# Patient Record
Sex: MALE | Race: OTHER | HISPANIC OR LATINO | URBAN - METROPOLITAN AREA
[De-identification: names, ages, dates, MRNs, and addresses within clinical notes are randomized per-mention and may not be internally consistent; named-entity substitution may affect disease eponyms.]

---

## 2020-04-16 ENCOUNTER — OFFICE VISIT (OUTPATIENT)
Dept: FAMILY MEDICINE CLINIC | Age: 40
End: 2020-04-16

## 2020-04-16 DIAGNOSIS — E11.9 DM TYPE 2, GOAL A1C TO BE DETERMINED (HCC): Primary | ICD-10-CM

## 2020-04-16 RX ORDER — GLIPIZIDE 10 MG/1
10 TABLET ORAL 2 TIMES DAILY
Qty: 180 TAB | Refills: 1 | Status: SHIPPED | OUTPATIENT
Start: 2020-04-16 | End: 2020-07-30

## 2020-04-16 RX ORDER — METFORMIN HYDROCHLORIDE 1000 MG/1
1000 TABLET ORAL 2 TIMES DAILY WITH MEALS
Qty: 180 TAB | Refills: 1 | Status: SHIPPED | OUTPATIENT
Start: 2020-04-16 | End: 2020-07-30 | Stop reason: SDUPTHER

## 2020-04-16 RX ORDER — METFORMIN HYDROCHLORIDE 1000 MG/1
2000 TABLET ORAL 2 TIMES DAILY WITH MEALS
COMMUNITY
End: 2020-04-16 | Stop reason: SDUPTHER

## 2020-04-16 RX ORDER — GLIPIZIDE 10 MG/1
10 TABLET ORAL 2 TIMES DAILY
COMMUNITY
End: 2020-04-16 | Stop reason: SDUPTHER

## 2020-04-16 NOTE — PATIENT INSTRUCTIONS
La linea directa para COVID preguntas o sintomas:     [phone]    El steffi para hacer cuca geovanna con la Ricky Fresh:    [phone]

## 2020-04-16 NOTE — PROGRESS NOTES
Assessment/Plan:    Diagnoses and all orders for this visit:    1. DM type 2, goal A1C to be determined ([facility]  -     metFORMIN (GLUCOPHAGE) 1,000 mg tablet; Take 1 Tab by mouth two (2) times daily (with meals). -     glipiZIDE (GLUCOTROL) 10 mg tablet; Take 1 Tab by mouth two (2) times a day. COVID 19 precautions and recommendations discussed with pt  Will schedule him for an office visit once we can, to include labs  [place], try to get records     Follow-up and Dispositions    · Return in about 3 months (around 2020). ROOSEVELT Contreras 6 expressed understanding of this plan. An AVS was printed and given to the patient.      ----------------------------------------------------------------------    Chief Complaint   Patient presents with    Diabetes     medications refill       History of Present Illness:  Telephone virtual visit, pt declined video  Pt consented to visit after being identified by name and   He is being seen as a new pt today for \"medication refills\". He is a diabetic (not clear if type 1 or 2, but dx'd about 13 years ago and weighed 235 on his 5'8\" frame at that time). Initially, was on insulin but that \"didn't work well\" so since then has been on oral agents. Current weight is closer to 205 and he is 5 '8\". He ran out of the medication 3 months ago. When taking meds, his blood sugar is never over 170's he states. Off meds, like now, it is in the 200's. He states that he is following a diabetic diet. He does not smoke or drink ETOH. He denies unexplained weight loss, polyuria/polydipsia but does endorse some blurry vision at times  Due to COVID restrictions, we are not doing routine labs and since he is stable currently, we will wait until we reopen to get the labs       No past medical history on file.     Current Outpatient Medications   Medication Sig Dispense Refill    metFORMIN (GLUCOPHAGE) 1,000 mg tablet Take 1 Tab by mouth two (2) times daily (with meals). 180 Tab 1    glipiZIDE (GLUCOTROL) 10 mg tablet Take 1 Tab by mouth two (2) times a day. 180 Tab 1       No Known Allergies    Social History     Tobacco Use    Smoking status: Not on file   Substance Use Topics    Alcohol use: Not on file    Drug use: Not on file       No family history on file. Physical Exam:     There were no vitals taken for this visit.   No PE done   A&Ox3  WDWN NAD  Respirations normal and non labored

## 2020-04-16 NOTE — PROGRESS NOTES
Coordination of Care  1. Have you been to the ER, urgent care clinic since your last visit? Hospitalized since your last visit? No    2. Have you seen or consulted any other health care providers outside of the [address] [place] [address] since your last visit? Include any pap smears or colon screening. No    Does the patient need refills? YES    Learning Assessment Complete? no  Depression Screening complete in the past 12 months? yes     Reviewed discharge instructions via teleconference with assistance of  Gab Hahn. Reviewed prescriptions, texted Goodrx coupons to patient's phone. Pt is unsure of the name of his doctor from [place], states that he will call and leave a VM with the name of the doctor when he gets back to his house as he is at work currently. Pt given office number and [facility] appointment line with hours of operation. Es Adler to mail discharge instructions. Es Gurrola to mail request of records form for the patient to mail back. Briana Dickson.  [facility]

## 2020-07-30 ENCOUNTER — HOSPITAL ENCOUNTER (OUTPATIENT)
Dept: LAB | Age: 40
Discharge: HOME | End: 2020-07-30

## 2020-07-30 ENCOUNTER — OFFICE VISIT (OUTPATIENT)
Dept: FAMILY MEDICINE CLINIC | Age: 40
End: 2020-07-30

## 2020-07-30 VITALS
BODY MASS INDEX: 36.42 KG/M2 | SYSTOLIC BLOOD PRESSURE: 132 MMHG | OXYGEN SATURATION: 100 % | HEIGHT: 65 IN | RESPIRATION RATE: 18 BRPM | TEMPERATURE: 98.4 F | WEIGHT: 218.6 LBS | HEART RATE: 79 BPM | DIASTOLIC BLOOD PRESSURE: 81 MMHG

## 2020-07-30 DIAGNOSIS — E11.65 TYPE 2 DIABETES MELLITUS WITH HYPERGLYCEMIA, WITHOUT LONG-TERM CURRENT USE OF INSULIN (HCC): Primary | ICD-10-CM

## 2020-07-30 DIAGNOSIS — E11.40 TYPE 2 DIABETES MELLITUS WITH DIABETIC NEUROPATHY, WITHOUT LONG-TERM CURRENT USE OF INSULIN (HCC): ICD-10-CM

## 2020-07-30 DIAGNOSIS — N52.9 ERECTILE DYSFUNCTION, UNSPECIFIED ERECTILE DYSFUNCTION TYPE: ICD-10-CM

## 2020-07-30 PROBLEM — E66.01 SEVERE OBESITY (HCC): Status: ACTIVE | Noted: 2020-07-30

## 2020-07-30 LAB
ALBUMIN SERPL-MCNC: 3.7 G/DL (ref 3.5–5)
ALBUMIN/GLOB SERPL: 1 {RATIO} (ref 1.1–2.2)
ALP SERPL-CCNC: 116 U/L (ref 45–117)
ALT SERPL-CCNC: 34 U/L (ref 12–78)
ANION GAP SERPL CALC-SCNC: 9 MMOL/L (ref 5–15)
AST SERPL-CCNC: 16 U/L (ref 15–37)
BASOPHILS # BLD: 0.1 K/UL (ref 0–0.1)
BASOPHILS NFR BLD: 1 % (ref 0–1)
BILIRUB SERPL-MCNC: 0.6 MG/DL (ref 0.2–1)
BUN SERPL-MCNC: 16 MG/DL (ref 6–20)
BUN/CREAT SERPL: 16 (ref 12–20)
CALCIUM SERPL-MCNC: 8.5 MG/DL (ref 8.5–10.1)
CHLORIDE SERPL-SCNC: 99 MMOL/L (ref 97–108)
CO2 SERPL-SCNC: 25 MMOL/L (ref 21–32)
CREAT SERPL-MCNC: 1.02 MG/DL (ref 0.7–1.3)
CREAT UR-MCNC: 74 MG/DL
DIFFERENTIAL METHOD BLD: ABNORMAL
EOSINOPHIL # BLD: 0.6 K/UL (ref 0–0.4)
EOSINOPHIL NFR BLD: 8 % (ref 0–7)
ERYTHROCYTE [DISTWIDTH] IN BLOOD BY AUTOMATED COUNT: 13.2 % (ref 11.5–14.5)
EST. AVERAGE GLUCOSE BLD GHB EST-MCNC: 255 MG/DL
GLOBULIN SER CALC-MCNC: 3.7 G/DL (ref 2–4)
GLUCOSE POC: 313 MG/DL
GLUCOSE SERPL-MCNC: 291 MG/DL (ref 65–100)
HBA1C MFR BLD: 10.5 % (ref 4–5.6)
HCT VFR BLD AUTO: 42.1 % (ref 36.6–50.3)
HGB BLD-MCNC: 13.9 G/DL (ref 12.1–17)
IMM GRANULOCYTES # BLD AUTO: 0 K/UL (ref 0–0.04)
IMM GRANULOCYTES NFR BLD AUTO: 0 % (ref 0–0.5)
LYMPHOCYTES # BLD: 3.1 K/UL (ref 0.8–3.5)
LYMPHOCYTES NFR BLD: 39 % (ref 12–49)
MCH RBC QN AUTO: 27.9 PG (ref 26–34)
MCHC RBC AUTO-ENTMCNC: 33 G/DL (ref 30–36.5)
MCV RBC AUTO: 84.4 FL (ref 80–99)
MICROALBUMIN UR-MCNC: 2.84 MG/DL
MICROALBUMIN/CREAT UR-RTO: 38 MG/G (ref 0–30)
MONOCYTES # BLD: 0.7 K/UL (ref 0–1)
MONOCYTES NFR BLD: 9 % (ref 5–13)
NEUTS SEG # BLD: 3.5 K/UL (ref 1.8–8)
NEUTS SEG NFR BLD: 43 % (ref 32–75)
NRBC # BLD: 0 K/UL (ref 0–0.01)
NRBC BLD-RTO: 0 PER 100 WBC
PLATELET # BLD AUTO: 334 K/UL (ref 150–400)
PMV BLD AUTO: 10.8 FL (ref 8.9–12.9)
POTASSIUM SERPL-SCNC: 3.9 MMOL/L (ref 3.5–5.1)
PROT SERPL-MCNC: 7.4 G/DL (ref 6.4–8.2)
RBC # BLD AUTO: 4.99 M/UL (ref 4.1–5.7)
SODIUM SERPL-SCNC: 133 MMOL/L (ref 136–145)
WBC # BLD AUTO: 8 K/UL (ref 4.1–11.1)

## 2020-07-30 PROCEDURE — 82043 UR ALBUMIN QUANTITATIVE: CPT

## 2020-07-30 PROCEDURE — 83036 HEMOGLOBIN GLYCOSYLATED A1C: CPT

## 2020-07-30 PROCEDURE — 85025 COMPLETE CBC W/AUTO DIFF WBC: CPT

## 2020-07-30 PROCEDURE — 80053 COMPREHEN METABOLIC PANEL: CPT

## 2020-07-30 RX ORDER — GLIPIZIDE 10 MG/1
10 TABLET ORAL 2 TIMES DAILY
Qty: 180 TAB | Refills: 1 | Status: SHIPPED | OUTPATIENT
Start: 2020-07-30 | End: 2021-10-13 | Stop reason: SDUPTHER

## 2020-07-30 RX ORDER — METFORMIN HYDROCHLORIDE 1000 MG/1
1000 TABLET ORAL 2 TIMES DAILY WITH MEALS
Qty: 180 TAB | Refills: 1 | Status: SHIPPED | OUTPATIENT
Start: 2020-07-30 | End: 2021-10-13 | Stop reason: SDUPTHER

## 2020-07-30 RX ORDER — SILDENAFIL CITRATE 20 MG/1
100 TABLET ORAL
Qty: 40 TAB | Refills: 1 | Status: SHIPPED | OUTPATIENT
Start: 2020-07-30 | End: 2021-10-13

## 2020-07-30 NOTE — PROGRESS NOTES
Assessment/Plan:       ICD-10-CM ICD-9-CM    1. Type 2 diabetes mellitus with hyperglycemia, without long-term current use of insulin (HCC)  E11.65 250.00 AMB POC GLUCOSE BLOOD, BY GLUCOSE MONITORING DEVICE     652.06 METABOLIC PANEL, COMPREHENSIVE      HEMOGLOBIN A1C WITH EAG      MICROALBUMIN, UR, RAND W/ MICROALB/CREAT RATIO      CBC WITH AUTOMATED DIFF      glipiZIDE (GLUCOTROL) 10 mg tablet      metFORMIN (GLUCOPHAGE) 1,000 mg tablet   2. Erectile dysfunction, unspecified erectile dysfunction type  N52.9 607.84 sildenafiL, pulmonary hypertension, (Revatio) 20 mg tablet   3. Type 2 diabetes mellitus with diabetic neuropathy, without long-term current use of insulin (HCC)  E11.40 250.60      357.2      Future labs: fasting lipid panel, tsh, testosterone  Follow up appointment: telephone visit 1 month, note sent to registrar for scheduling. Changes/recommendations made today: take glipizide 10mg 30 minutes before breakfast and 30 minutes before dinner. Previously taking glipizide 2 hours before food in the morning and 2 hours after food in the evening. Consider Trulicity once weekly from PAP for better glycemic control. Consider decreasing glipizide to 5 mg before dinner. Consider small basal dose of lantus at night and consider Januvia or Trulicity. Current Outpatient Medications   Medication Sig Dispense    glipiZIDE (GLUCOTROL) 10 mg tablet Take 1 Tab by mouth two (2) times a day. 30 minutes before food. Pakistani sig 180 Tab    metFORMIN (GLUCOPHAGE) 1,000 mg tablet Take 1 Tab by mouth two (2) times daily (with meals). 180 Tab    sildenafiL, pulmonary hypertension, (Revatio) 20 mg tablet Take 5 Tabs by mouth daily as needed (30 minutes before sexual activity). 40 Tab     No current facility-administered medications for this visit.       [facility]AN-MAIN OFFICE  Subjective:     Chief Complaint   Patient presents with    Follow Up Chronic Condition     DM    [place] is a 46 y.o. OTHER male who speaks East Timorese.  used? yes   HPI: getting angry easily, usually at work, wants something for depression and wants viagra. States sleeping well through the night, no longer waking up 2-3 times to urinate. Says his wife calls him 'bipolar' because he'll be angry one minute and then happy. States he had a liver ultrasound 2 years ago and told that his cholesterol was fine. May have had liver ultrasound due to hepatic steatosis. He did not run out of medication this time. He'll miss the morning meds sometimes. This morning he did not eat breakfast.  He took glipizide 10 mg and metformin at 6 am.  Then he ate breakfast at 8 am.  In the evening he was taking glipizide 10 mg and metformin between 9 and 10 pm.  He eats dinner at 5 pm.  Insulin was stopped because glucose got down to 70 or 80 and he was shaking - was at [facility] before this. Lived here 10 yrs, [place] 6 years, now back for a year here. He was taking a medicine to calm him down, small pills, 5 mg. \"[facility]\" was the clinic in [address] [place] [address]. About once every 3 weeks he feels dizzy or weak in the afternoon and drinks a soda. Otherwise denies symptoms of hypoglycemia. Had an ultrasound of the liver 2 years ago and told his cholesterol was normal.  Reports for many years he has not had a morning erection. He has a friend who was helped by \"hormones\". ROS:   Positive for blurry vision, numbness and tingling in his feet, pain in the feet  No hypoglycemia; No increased frequency of urination,   No weight loss,  No chest pain, dyspnea or TIA's;   Working: Third week that he's working every day  Last ate: did not eat breakfast but took glipizide and metformin at 6 am this morning, had a soda immediately before appointment today. Results for orders placed or performed in visit on 07/30/20   AMB POC GLUCOSE BLOOD, BY GLUCOSE MONITORING DEVICE   Result Value Ref Range    Glucose  nonfasting mg/dL     Measuring glucoses?  yes Fasting values 170 to 180; 2 hour post prandial values 270 to 340  Social History: He reports that he has quit smoking. He does not have any smokeless tobacco history on file. He reports previous alcohol use. He reports previous drug use. Family History: His family history includes Diabetes in his maternal grandfather, maternal grandmother, mother, paternal grandfather, and another family member; No Known Problems in his father, paternal grandmother, and sister. Objective:   Weight 3 months ago was 205. Vitals:    07/30/20 1511   BP: 132/81   Pulse: 79   Resp: 18   Temp: 98.4 °F (36.9 °C)   TempSrc: Oral   SpO2: 100%   Weight: 218 lb 9.6 oz (99.2 kg)   Height: 5' 4.96\" (1.65 m)    No LMP for male patient. Results for orders placed or performed during the hospital encounter of 07/30/20   CBC WITH AUTOMATED DIFF   Result Value Ref Range    WBC 8.0 4.1 - 11.1 K/uL    RBC 4.99 4. 10 - 5.70 M/uL    HGB 13.9 12.1 - 17.0 g/dL    HCT 42.1 36.6 - 50.3 %    MCV 84.4 80.0 - 99.0 FL    MCH 27.9 26.0 - 34.0 PG    MCHC 33.0 30.0 - 36.5 g/dL    RDW 13.2 11.5 - 14.5 %    PLATELET 474 723 - 578 K/uL    MPV 10.8 8.9 - 12.9 FL    NRBC 0.0 0  WBC    ABSOLUTE NRBC 0.00 0.00 - 0.01 K/uL    NEUTROPHILS 43 32 - 75 %    LYMPHOCYTES 39 12 - 49 %    MONOCYTES 9 5 - 13 %    EOSINOPHILS 8 (H) 0 - 7 %    BASOPHILS 1 0 - 1 %    IMMATURE GRANULOCYTES 0 0.0 - 0.5 %    ABS. NEUTROPHILS 3.5 1.8 - 8.0 K/UL    ABS. LYMPHOCYTES 3.1 0.8 - 3.5 K/UL    ABS. MONOCYTES 0.7 0.0 - 1.0 K/UL    ABS. EOSINOPHILS 0.6 (H) 0.0 - 0.4 K/UL    ABS. BASOPHILS 0.1 0.0 - 0.1 K/UL    ABS. IMM.  GRANS. 0.0 0.00 - 0.04 K/UL    DF AUTOMATED     METABOLIC PANEL, COMPREHENSIVE   Result Value Ref Range    Sodium 133 (L) 136 - 145 mmol/L    Potassium 3.9 3.5 - 5.1 mmol/L    Chloride 99 97 - 108 mmol/L    CO2 25 21 - 32 mmol/L    Anion gap 9 5 - 15 mmol/L    Glucose 291 (H) 65 - 100 mg/dL    BUN 16 6 - 20 MG/DL    Creatinine 1.02 0.70 - 1.30 MG/DL    BUN/Creatinine ratio 16 12 - 20      GFR est AA >60 >60 ml/min/1.73m2    GFR est non-AA >60 >60 ml/min/1.73m2    Calcium 8.5 8.5 - 10.1 MG/DL    Bilirubin, total 0.6 0.2 - 1.0 MG/DL    ALT (SGPT) 34 12 - 78 U/L    AST (SGOT) 16 15 - 37 U/L    Alk. phosphatase 116 45 - 117 U/L    Protein, total 7.4 6.4 - 8.2 g/dL    Albumin 3.7 3.5 - 5.0 g/dL    Globulin 3.7 2.0 - 4.0 g/dL    A-G Ratio 1.0 (L) 1.1 - 2.2     HEMOGLOBIN A1C WITH EAG   Result Value Ref Range    Hemoglobin A1c 10.5 (H) 4.0 - 5.6 %    Est. average glucose 255 mg/dL   MICROALBUMIN, UR, RAND W/ MICROALB/CREAT RATIO   Result Value Ref Range    Microalbumin,urine random 2.84 MG/DL    Creatinine, urine 74.00 mg/dL    Microalbumin/Creat ratio (mg/g creat) 38 (H) 0 - 30 mg/g   Results for orders placed or performed in visit on 07/30/20   AMB POC GLUCOSE BLOOD, BY GLUCOSE MONITORING DEVICE   Result Value Ref Range    Glucose  mg/dL     Wt Readings from Last 2 Encounters:   07/30/20 218 lb 9.6 oz (99.2 kg)    Weight increased from 205     Physical Exam:  Constitutional: He appears well-developed. Cardiovascular: Normal rate, regular rhythm, normal heart sounds and intact distal pulses. No murmur heard. Pulmonary/Chest: Effort normal and breath sounds normal.   Musculoskeletal: He exhibits no edema. No ulcers of the lower extremities. No results found for: CHOL, CHOLX, CHLST, CHOLV, HDL, HDLP, LDL, LDLC, DLDLP, TGLX, TRIGL, TRIGP, CHHD, CHHDX  Lab Results   Component Value Date/Time    ALT (SGPT) 34 07/30/2020 03:35 PM    Alk.  phosphatase 116 07/30/2020 03:35 PM    Bilirubin, total 0.6 07/30/2020 03:35 PM     Lab Results   Component Value Date/Time    Microalbumin/Creat ratio (mg/g creat) 38 (H) 07/30/2020 03:35 PM    Microalbumin,urine random 2.84 07/30/2020 03:35 PM    Creatinine 1.02 07/30/2020 03:35 PM     Lab Results   Component Value Date/Time    GFR est AA >60 07/30/2020 03:35 PM    GFR est non-AA >60 07/30/2020 03:35 PM     Assessment/Plan:   Diagnoses and all orders for this visit: 1. Type 2 diabetes mellitus with hyperglycemia, without long-term current use of insulin (HCC)  -     AMB POC GLUCOSE BLOOD, BY GLUCOSE MONITORING DEVICE  -     METABOLIC PANEL, COMPREHENSIVE; Future  -     HEMOGLOBIN A1C WITH EAG; Future  -     MICROALBUMIN, UR, RAND W/ MICROALB/CREAT RATIO; Future  -     CBC WITH AUTOMATED DIFF; Future  -     glipiZIDE (GLUCOTROL) 10 mg tablet; Take 1 Tab by mouth two (2) times a day. 30 minutes before food. Bengali sig  -     metFORMIN (GLUCOPHAGE) 1,000 mg tablet; Take 1 Tab by mouth two (2) times daily (with meals). 2. Erectile dysfunction, unspecified erectile dysfunction type  -     sildenafiL, pulmonary hypertension, (Revatio) 20 mg tablet; Take 5 Tabs by mouth daily as needed (30 minutes before sexual activity). 3. Type 2 diabetes mellitus with diabetic neuropathy, without long-term current use of insulin (HCC)      Diabetes Mellitus type 2, under Poor control. Blood pressure under uncertain control. Greater than 50% of this 25 minute visit was spent in face-to-face counseling/coordination of care regarding diabetes management. Discussed reason to take glipizide 30 minutes before breakfast and dinner. Discussed anger problems may in part be related to glucose variability. Telephone 1 month. Diane Olvera DNP, FNP-BC, [facility] expressed understanding of this plan.

## 2020-07-30 NOTE — PROGRESS NOTES
Coordination of Care  1. Have you been to the ER, urgent care clinic since your last visit? Hospitalized since your last visit? No    2. Have you seen or consulted any other health care providers outside of the [address] [place] [address] since your last visit? Include any pap smears or colon screening. No    Does the patient need refills? YES, depression- \"when I get angry I go directly from one to ten\" and viagra refill requested    Learning Assessment Complete? no  Depression Screening complete in the past 12 months? yes    Intake completed with assistance of jacinto Araujo. Rashaad Mims.  [place], [place]

## 2020-08-10 ENCOUNTER — PATIENT OUTREACH (OUTPATIENT)
Dept: FAMILY MEDICINE CLINIC | Age: 40
End: 2020-08-10

## 2020-08-11 NOTE — PROGRESS NOTES
8/11/20    [address] [facility]   Chronic Disease Management Nurse Navigator Note    Diagnosis: Diabetes Mellitus    Adherence - PCP: NO  Adherence - Medications: compliant with all meds  Barriers: Meal choices is a challenge. ED visit with reachout: NO  If ED visit with reachout, plan: na  If ED visit without reachout, details: na    DM  Neuropathic pain NO  Vision disturbance: no  Polyuria:no  Polydipsia: yes  24 hour dietary recall:  TBD on next NN call. BG I day ago was 320 fasting. Goals      Patient verbalizes understanding of self -management goals of living with Diabetes. 8/11/20  Xochitl Mays will monitor BG at home and report greater than 250 and less than 80 to NN ASAP. Continue taking medicatins as ordered, call Nn with concerns, start adding 2 vegeatble to each meal, NN will call in 1 week for BG results.  IM          Time in discussion: 45 minutes

## 2020-08-13 NOTE — PROGRESS NOTES
Tc to the pt. He was called and given his A1C results and told his average daily BS is 255. The pt was told the provider is referring him to a NN Germaine. She will be calling him to talk with him to discuss his DM and assist him with helpful tips and ways he can help lower his BS numbers. The pt stated he has purchased a new BS meter. It was discussed with the pt ways to check the BS fasting and 2 hrs after a meal and logging the numbers for the provider to go over them at his next appt. The pt verbalized understanding. He denied the need for an  during this call.  Rosy Potter RN

## 2021-03-26 ENCOUNTER — TELEPHONE (OUTPATIENT)
Dept: FAMILY MEDICINE CLINIC | Age: 41
End: 2021-03-26

## 2021-03-26 NOTE — TELEPHONE ENCOUNTER
Tc to the pt with the assistance of Nita Anne. The pt for 3 days I have red eyes and lose balance when I bend down. The pt stated when I drink water I start coughing for 3 weeks. Inflammation in my eye lids. Denied numbness in face. Mild H/A, last week for 2 days. Pt stated he has DM, and normally tired and weak. The pt stated no injury or accident. The pt denied any coughing except when he drinks cold water. The pt stated he thinks he has a cold. Non productive cough. No fever, no chills, no N/V. The pt was advised to go to the hospital with any emergency which would be Chest pain, SOB, Numbness or weakness in extremities one side of the or the other. The pt was advised to go to have a Covid test and contact this nurse back for an CAV appt with a provider once he has his results form the Covid test. The pt was given phone # and address of a walk in clinic to go get a Covid test. The pt verbalized understanding.  Alyssa Apley, RN

## 2021-10-13 ENCOUNTER — VIRTUAL VISIT (OUTPATIENT)
Dept: FAMILY MEDICINE CLINIC | Age: 41
End: 2021-10-13

## 2021-10-13 DIAGNOSIS — J06.9 ACUTE UPPER RESPIRATORY INFECTION: Primary | ICD-10-CM

## 2021-10-13 DIAGNOSIS — E11.65 TYPE 2 DIABETES MELLITUS WITH HYPERGLYCEMIA, WITHOUT LONG-TERM CURRENT USE OF INSULIN (HCC): ICD-10-CM

## 2021-10-13 PROCEDURE — 99213 OFFICE O/P EST LOW 20 MIN: CPT | Performed by: FAMILY MEDICINE

## 2021-10-13 RX ORDER — OXYMETAZOLINE HCL 0.05 %
2 SPRAY, NON-AEROSOL (ML) NASAL 2 TIMES DAILY
Qty: 15 ML | Refills: 0 | Status: SHIPPED | OUTPATIENT
Start: 2021-10-13 | End: 2021-10-16

## 2021-10-13 RX ORDER — GLIPIZIDE 10 MG/1
10 TABLET ORAL 2 TIMES DAILY
Qty: 180 TABLET | Refills: 1 | Status: SHIPPED | OUTPATIENT
Start: 2021-10-13 | End: 2022-10-04 | Stop reason: SDUPTHER

## 2021-10-13 RX ORDER — IBUPROFEN 800 MG/1
800 TABLET ORAL
Qty: 30 TABLET | Refills: 0 | Status: SHIPPED | OUTPATIENT
Start: 2021-10-13

## 2021-10-13 RX ORDER — BENZONATATE 200 MG/1
200 CAPSULE ORAL
Qty: 30 CAPSULE | Refills: 0 | Status: SHIPPED | OUTPATIENT
Start: 2021-10-13 | End: 2021-10-20

## 2021-10-13 RX ORDER — METFORMIN HYDROCHLORIDE 1000 MG/1
1000 TABLET ORAL 2 TIMES DAILY WITH MEALS
Qty: 180 TABLET | Refills: 1 | Status: SHIPPED | OUTPATIENT
Start: 2021-10-13 | End: 2022-10-04 | Stop reason: SDUPTHER

## 2021-10-13 NOTE — PROGRESS NOTES
Lauro Soares is a 45 y.o. male   Chief Complaint   Patient presents with    Concern For COVID-19 (Coronavirus)     Pt states has the fever/chills, muscle pain, headaches, weakness, cough, shortness of breath, loss of smell, runny nose, on and off x 4 weeks    Follow Up Chronic Condition     diabetes     >>>>>>>>>>>>>TELEPHONE ENCOUNTER<<<<<<<<<<<<<<<<<<<<      ASSESSMENT AND PLAN:    1. Type 2 diabetes mellitus with hyperglycemia, without long-term current use of insulin (HCC)  >Medications refilled. >Pt will need DM2 followup once acute illness and quarantine is completed. - glipiZIDE (GLUCOTROL) 10 mg tablet; Take 1 Tablet by mouth two (2) times a day. 30 minutes before food. Mozambican sig  Dispense: 180 Tablet; Refill: 1  - metFORMIN (GLUCOPHAGE) 1,000 mg tablet; Take 1 Tablet by mouth two (2) times daily (with meals). Dispense: 180 Tablet; Refill: 1    2. Acute upper respiratory infection  Vaccinated, but had Covid+ contact at work.  > COVID testing  > Symptomatic management  > Quarantine  > ER precautions reviewed. - oxymetazoline (Afrin, oxymetazoline,) 0.05 % nasal spray; 2 Sprays by Both Nostrils route two (2) times a day for 3 days. Dispense: 15 mL; Refill: 0  - ibuprofen (MOTRIN) 800 mg tablet; Take 1 Tablet by mouth every eight (8) hours as needed for Pain (or fever). Dispense: 30 Tablet; Refill: 0  - benzonatate (TESSALON) 200 mg capsule; Take 1 Capsule by mouth three (3) times daily as needed for Cough for up to 7 days. Dispense: 30 Capsule; Refill: 0          SUBJECTIVE:    HPI:  Lauro Soares is a 45 y.o. male who presents with URI symptoms with concern for COVID-19. He is fully vaccinated but a coworker tested positive. He has had 4 weeks of night fevers, cough, nasal congestion, body aches, headaches and weakness. Currently the nasal congestion is the worst symptom for him. He denies SOB or TRONCOSO. He is out of his diabetes medications, his last A1C in July 2020 was 10.5.   (His wife was my last patient and they are currently . She reports that she was the one managing his DM medications so she's worried that he is not taking care of himself now. She also warns me that he tends to underplay his symptoms. She says his cough has been really bad. She has given him NSAIDs for his body aches)      Review of Systems   Constitutional: Positive for fever and malaise/fatigue. HENT: Positive for congestion and sore throat. Negative for ear pain. Eyes: Negative for blurred vision. Respiratory: Positive for cough, sputum production and shortness of breath. Cardiovascular: Negative for chest pain, palpitations and leg swelling. Gastrointestinal: Positive for nausea. Negative for abdominal pain, constipation, diarrhea and vomiting. Genitourinary: Negative. Musculoskeletal: Positive for myalgias. Skin: Negative for rash. Neurological: Positive for dizziness and headaches. There were no vitals taken for this visit. Physical Exam Not performed; telephone encounter.

## 2021-10-13 NOTE — PROGRESS NOTES
Per patient no vital equipment available at home  Coordination of Care  1. Have you been to the ER, urgent care clinic since your last visit? Hospitalized since your last visit? No    2. Have you seen or consulted any other health care providers outside of the [address] [place] [address] since your last visit? Include any pap smears or colon screening. No    Does the patient need refills? YES    Learning Assessment Complete? yes  Depression Screening complete in the past 12 months? yes     1455 pm: Per provider following notes \"COVID testing location, Meds sent to [facility] (including DM refills). \" CMA called the patient to confirmed receipt of the GoodRx coupons. Explained to pt the coupon process and discussed medication/refills with pt. CMA made the patient to writhe the information on a piece of paper on how he is going to take the medicine/dosis. Free covid 19 testing site from the Health t at [address] was given to pt and the [employer] free daily testing information too.  Patient verbalized understanding

## 2022-03-19 PROBLEM — E66.01 SEVERE OBESITY (HCC): Status: ACTIVE | Noted: 2020-07-30

## 2022-11-07 ENCOUNTER — HOSPITAL ENCOUNTER (OUTPATIENT)
Dept: LAB | Age: 42
Discharge: HOME | End: 2022-11-07

## 2022-11-07 ENCOUNTER — OFFICE VISIT (OUTPATIENT)
Dept: FAMILY MEDICINE CLINIC | Age: 42
End: 2022-11-07

## 2022-11-07 VITALS
SYSTOLIC BLOOD PRESSURE: 129 MMHG | WEIGHT: 213 LBS | OXYGEN SATURATION: 98 % | BODY MASS INDEX: 35.49 KG/M2 | DIASTOLIC BLOOD PRESSURE: 74 MMHG | HEART RATE: 93 BPM | TEMPERATURE: 97.3 F

## 2022-11-07 DIAGNOSIS — E11.65 TYPE 2 DIABETES MELLITUS WITH HYPERGLYCEMIA, WITHOUT LONG-TERM CURRENT USE OF INSULIN (HCC): ICD-10-CM

## 2022-11-07 DIAGNOSIS — Z23 ENCOUNTER FOR IMMUNIZATION: ICD-10-CM

## 2022-11-07 DIAGNOSIS — E11.65 TYPE 2 DIABETES MELLITUS WITH HYPERGLYCEMIA, WITHOUT LONG-TERM CURRENT USE OF INSULIN (HCC): Primary | ICD-10-CM

## 2022-11-07 DIAGNOSIS — F34.1 DYSTHYMIA: ICD-10-CM

## 2022-11-07 LAB — GLUCOSE POC: 273 MG/DL

## 2022-11-07 PROCEDURE — 90686 IIV4 VACC NO PRSV 0.5 ML IM: CPT

## 2022-11-07 PROCEDURE — 80061 LIPID PANEL: CPT

## 2022-11-07 PROCEDURE — 82962 GLUCOSE BLOOD TEST: CPT | Performed by: FAMILY MEDICINE

## 2022-11-07 PROCEDURE — 83721 ASSAY OF BLOOD LIPOPROTEIN: CPT

## 2022-11-07 PROCEDURE — 90471 IMMUNIZATION ADMIN: CPT

## 2022-11-07 PROCEDURE — 36415 COLL VENOUS BLD VENIPUNCTURE: CPT

## 2022-11-07 PROCEDURE — 99213 OFFICE O/P EST LOW 20 MIN: CPT | Performed by: FAMILY MEDICINE

## 2022-11-07 PROCEDURE — 82043 UR ALBUMIN QUANTITATIVE: CPT

## 2022-11-07 PROCEDURE — 80053 COMPREHEN METABOLIC PANEL: CPT

## 2022-11-07 PROCEDURE — 83036 HEMOGLOBIN GLYCOSYLATED A1C: CPT

## 2022-11-07 RX ORDER — CITALOPRAM 20 MG/1
20 TABLET, FILM COATED ORAL DAILY
Qty: 90 TABLET | Refills: 2 | Status: SHIPPED | OUTPATIENT
Start: 2022-11-07

## 2022-11-07 RX ORDER — GLIPIZIDE 10 MG/1
10 TABLET ORAL 2 TIMES DAILY
Qty: 180 TABLET | Refills: 3 | Status: SHIPPED | OUTPATIENT
Start: 2022-11-07

## 2022-11-07 RX ORDER — METFORMIN HYDROCHLORIDE 1000 MG/1
1000 TABLET ORAL 2 TIMES DAILY WITH MEALS
Qty: 180 TABLET | Refills: 3 | Status: SHIPPED | OUTPATIENT
Start: 2022-11-07

## 2022-11-07 NOTE — PROGRESS NOTES
Name and  confirmed w/ pt. Vaccine administered per pt request after obtaining consent, confirming VIS understanding, and ruling out contraindications. Pt instructed about signs of allergic reactions and when to seek emergency care. Pt verbalized understanding. Pt tolerated vaccination well, no adverse effects noted. Recorded in EMR and VIIS. Pt instructed to stay in waiting area for 15 min for observation. Pt discharged in stable condition. CVAN  [facility] assisted with interpretation.

## 2022-11-07 NOTE — PROGRESS NOTES
Coordination of Care  1. Have you been to the ER, urgent care clinic since your last visit? Hospitalized since your last visit? No    2. Have you seen or consulted any other health care providers outside of the [address] [place] [address] since your last visit? Include any pap smears or colon screening. No    Does the patient need refills?  N/A    Learning Assessment Complete? yes  Results for orders placed or performed in visit on 11/07/22   AMB POC GLUCOSE BLOOD, BY GLUCOSE MONITORING DEVICE   Result Value Ref Range    Glucose  MG/DL

## 2022-11-07 NOTE — PROGRESS NOTES
I have printed AVS and reviewed it with patient today. Patient verbalized understanding. I reviewed with patient medications sent to pharmacy and how the medication is taken. Patient verbalized understanding and did not have any questions concerning his medications. No GoodRx was needed. I instructed patient to schedule a follow-up appointment prior to leaving today. Patient verbalized understanding. Patient correctly stated his full name and date of birth prior to the information shared.  [id number] with the [facility] services assisted with this discharge.   Génesis Brown RN

## 2022-11-07 NOTE — PROGRESS NOTES
HISTORY OF PRESENT ILLNESS  Chanel Calles is a 37 y.o. male. HPI  Patient states he needs refills of his medication for diabetes. He also has had problems with dysthymia. Gets very angry easily. Interested in medication management,  Had been offered SSRI in the past but patient  Did not started them  States he was cleaning gutters at home and then fell,  he had fracture his right foot and had surgery, will need subsequent surgeries. Review of Systems   Constitutional:  Negative for chills, fever and weight loss. Respiratory:  Negative for cough, hemoptysis and sputum production. Cardiovascular:  Negative for chest pain, palpitations and orthopnea. Gastrointestinal:  Negative for heartburn, nausea and vomiting. Musculoskeletal:  Positive for joint pain. Right ankle and foot pain   Psychiatric/Behavioral:  Negative for depression, hallucinations, memory loss and suicidal ideas. The patient is not nervous/anxious and does not have insomnia. Anger   /74   Pulse 93   Temp 97.3 °F (36.3 °C) (Temporal)   Wt 213 lb (96.6 kg)   SpO2 98%   BMI 35.49 kg/m²   Physical Exam  Constitutional:       General: He is not in acute distress. HENT:      Head: Normocephalic. Right Ear: Tympanic membrane normal.      Left Ear: Tympanic membrane normal.   Cardiovascular:      Rate and Rhythm: Normal rate and regular rhythm. Pulses: Normal pulses. Heart sounds: Normal heart sounds. No murmur heard. Pulmonary:      Effort: Pulmonary effort is normal.      Breath sounds: Normal breath sounds. No wheezing, rhonchi or rales. Abdominal:      General: Bowel sounds are normal. There is no distension. Palpations: Abdomen is soft. Tenderness: There is no abdominal tenderness. Hernia: No hernia is present. Musculoskeletal:      Cervical back: Normal range of motion. No rigidity.       Comments: Right ankle and foot pain, immobilized in aircast   Neurological:      Mental Status: He is alert. ASSESSMENT and PLAN  Diagnoses and all orders for this visit:    1. Type 2 diabetes mellitus with hyperglycemia, without long-term current use of insulin ([facility])  -     AMB POC GLUCOSE BLOOD, BY GLUCOSE MONITORING DEVICE  -     metFORMIN (GLUCOPHAGE) 1,000 mg tablet; Take 1 Tablet by mouth two (2) times daily (with meals). -     glipiZIDE (GLUCOTROL) 10 mg tablet; Take 1 Tablet by mouth two (2) times a day. 30 minutes before food. Omani sig  -     HEMOGLOBIN A1C WITH EAG; Future  -     METABOLIC PANEL, COMPREHENSIVE; Future  -     CBC WITH AUTOMATED DIFF; Future  -     MICROALBUMIN, UR, RAND W/ MICROALB/CREAT RATIO; Future  -     LIPID PANEL; Future    2. Dysthymia  -     citalopram (CELEXA) 20 mg tablet; Take 1 Tablet by mouth daily.     3. Encounter for immunization  -     INFLUENZA, FLUARIX, FLULAVAL, FLUZONE (AGE 6 MO+), AFLURIA(AGE 3Y+) IM, PF, [de-identified]5 ML    37year old male with type 2 diabetes, medications refilled, labs ordered  Dysthymia,  start celexa  Flu vaccine prescribed

## 2022-11-08 ENCOUNTER — TELEPHONE (OUTPATIENT)
Dept: FAMILY MEDICINE CLINIC | Age: 42
End: 2022-11-08

## 2022-11-08 LAB
ALBUMIN SERPL-MCNC: 3.5 G/DL (ref 3.5–5)
ALBUMIN/GLOB SERPL: 0.8 {RATIO} (ref 1.1–2.2)
ALP SERPL-CCNC: 112 U/L (ref 45–117)
ALT SERPL-CCNC: 27 U/L (ref 12–78)
ANION GAP SERPL CALC-SCNC: 9 MMOL/L (ref 5–15)
AST SERPL-CCNC: 17 U/L (ref 15–37)
BILIRUB SERPL-MCNC: 0.6 MG/DL (ref 0.2–1)
BUN SERPL-MCNC: 19 MG/DL (ref 6–20)
BUN/CREAT SERPL: 17 (ref 12–20)
CALCIUM SERPL-MCNC: 9.2 MG/DL (ref 8.5–10.1)
CHLORIDE SERPL-SCNC: 99 MMOL/L (ref 97–108)
CHOLEST SERPL-MCNC: 291 MG/DL
CO2 SERPL-SCNC: 26 MMOL/L (ref 21–32)
CREAT SERPL-MCNC: 1.13 MG/DL (ref 0.7–1.3)
CREAT UR-MCNC: 123 MG/DL
EST. AVERAGE GLUCOSE BLD GHB EST-MCNC: 295 MG/DL
GLOBULIN SER CALC-MCNC: 4.3 G/DL (ref 2–4)
GLUCOSE SERPL-MCNC: 321 MG/DL (ref 65–100)
HBA1C MFR BLD: 11.9 % (ref 4–5.6)
HDLC SERPL-MCNC: 44 MG/DL
HDLC SERPL: 6.6 {RATIO} (ref 0–5)
LDLC SERPL CALC-MCNC: ABNORMAL MG/DL (ref 0–100)
LDLC SERPL DIRECT ASSAY-MCNC: 188 MG/DL (ref 0–100)
MICROALBUMIN UR-MCNC: 15.5 MG/DL
MICROALBUMIN/CREAT UR-RTO: 126 MG/G (ref 0–30)
POTASSIUM SERPL-SCNC: 4.1 MMOL/L (ref 3.5–5.1)
PROT SERPL-MCNC: 7.8 G/DL (ref 6.4–8.2)
SODIUM SERPL-SCNC: 134 MMOL/L (ref 136–145)
TRIGL SERPL-MCNC: 402 MG/DL (ref ?–150)
VLDLC SERPL CALC-MCNC: ABNORMAL MG/DL

## 2022-11-09 NOTE — PROGRESS NOTES
Uncontrolled diabetes  Elevated cholesterol levels  Healthy diet and regular physical activity recommended  During his next visit we can discussed adding cholesterol medication  Patient can be informed

## 2023-01-25 ENCOUNTER — HOSPITAL ENCOUNTER (OUTPATIENT)
Dept: LAB | Age: 43
Discharge: HOME | End: 2023-01-25

## 2023-01-25 ENCOUNTER — OFFICE VISIT (OUTPATIENT)
Dept: FAMILY MEDICINE CLINIC | Age: 43
End: 2023-01-25

## 2023-01-25 VITALS
WEIGHT: 210.9 LBS | OXYGEN SATURATION: 100 % | DIASTOLIC BLOOD PRESSURE: 76 MMHG | HEART RATE: 85 BPM | SYSTOLIC BLOOD PRESSURE: 118 MMHG | TEMPERATURE: 97.9 F | BODY MASS INDEX: 35.14 KG/M2

## 2023-01-25 DIAGNOSIS — E11.65 TYPE 2 DIABETES MELLITUS WITH HYPERGLYCEMIA, WITHOUT LONG-TERM CURRENT USE OF INSULIN (HCC): Primary | ICD-10-CM

## 2023-01-25 DIAGNOSIS — F34.1 DYSTHYMIA: ICD-10-CM

## 2023-01-25 DIAGNOSIS — E11.65 TYPE 2 DIABETES MELLITUS WITH HYPERGLYCEMIA, WITHOUT LONG-TERM CURRENT USE OF INSULIN (HCC): ICD-10-CM

## 2023-01-25 LAB — GLUCOSE POC: NORMAL MG/DL

## 2023-01-25 PROCEDURE — 82962 GLUCOSE BLOOD TEST: CPT | Performed by: FAMILY MEDICINE

## 2023-01-25 PROCEDURE — 80061 LIPID PANEL: CPT

## 2023-01-25 PROCEDURE — 85025 COMPLETE CBC W/AUTO DIFF WBC: CPT

## 2023-01-25 PROCEDURE — 36415 COLL VENOUS BLD VENIPUNCTURE: CPT

## 2023-01-25 PROCEDURE — 80053 COMPREHEN METABOLIC PANEL: CPT

## 2023-01-25 PROCEDURE — 83036 HEMOGLOBIN GLYCOSYLATED A1C: CPT

## 2023-01-25 PROCEDURE — 99213 OFFICE O/P EST LOW 20 MIN: CPT | Performed by: FAMILY MEDICINE

## 2023-01-25 RX ORDER — CITALOPRAM 20 MG/1
20 TABLET, FILM COATED ORAL DAILY
Qty: 90 TABLET | Refills: 2 | Status: SHIPPED | OUTPATIENT
Start: 2023-01-25

## 2023-01-25 NOTE — PROGRESS NOTES
Patient discharged with AVS. Name and date of birth verified. Made aware of prescription sent to the pharmacy. Medication review completed. Instructed to schedule a follow-up appointment in 3 months. Patient was given an opportunity to voice questions/concerns. All questions were addressed. [facility] interpretor #[id number] assisted.

## 2023-01-25 NOTE — PROGRESS NOTES
Coordination of Care  1. Have you been to the ER, urgent care clinic since your last visit? Hospitalized since your last visit? [facility] 11/28/22 For lower extremity debridement     2. Have you seen or consulted any other health care providers outside of the [address] [place] [address] since your last visit? Include any pap smears or colon screening. No    Does the patient need refills? NO    Learning Assessment Complete?  yes  Depression Screening complete in the past 12 months? yes    Results for orders placed or performed in visit on 01/25/23   AMB POC GLUCOSE BLOOD, BY GLUCOSE MONITORING DEVICE   Result Value Ref Range    Glucose  F MG/DL

## 2023-01-25 NOTE — PROGRESS NOTES
HISTORY OF PRESENT ILLNESS  Christina Claude is a 40 y.o. male. HPI  Patient is compliant with medications. He has also stopped soft drinks. His diet is now low in carb. He had recent right foot surgery, then developed an infection in the area,  It is improving now. He received antibiotic with picc line for about 35 doses total.    Review of Systems   Constitutional:  Negative for chills, fever and weight loss. Respiratory:  Negative for cough, hemoptysis and sputum production. Cardiovascular:  Negative for chest pain, palpitations and orthopnea. Gastrointestinal:  Negative for heartburn, nausea and vomiting. Musculoskeletal:  Positive for joint pain. Right ankle and foot pain   Psychiatric/Behavioral:  Negative for depression, hallucinations, memory loss and suicidal ideas. The patient is not nervous/anxious and does not have insomnia. /76 (BP 1 Location: Left upper arm, BP Patient Position: Sitting, BP Cuff Size: Adult)   Pulse 85   Temp 97.9 °F (36.6 °C) (Temporal)   Wt 210 lb 14.4 oz (95.7 kg)   SpO2 100%   BMI 35.14 kg/m²   Physical Exam  Constitutional:       General: He is not in acute distress. HENT:      Head: Normocephalic. Right Ear: Tympanic membrane normal.      Left Ear: Tympanic membrane normal.   Cardiovascular:      Rate and Rhythm: Normal rate and regular rhythm. Pulses: Normal pulses. Heart sounds: Normal heart sounds. No murmur heard. Pulmonary:      Effort: Pulmonary effort is normal.      Breath sounds: Normal breath sounds. No wheezing, rhonchi or rales. Abdominal:      General: Bowel sounds are normal. There is no distension. Palpations: Abdomen is soft. Tenderness: There is no abdominal tenderness. Hernia: No hernia is present. Musculoskeletal:      Cervical back: Normal range of motion. No rigidity. Neurological:      Mental Status: He is alert. ASSESSMENT and PLAN  Diagnoses and all orders for this visit:    1. Type 2 diabetes mellitus with hyperglycemia, without long-term current use of insulin ([facility])  -     AMB POC GLUCOSE BLOOD, BY GLUCOSE MONITORING DEVICE  -     CBC WITH AUTOMATED DIFF; Future  -     METABOLIC PANEL, COMPREHENSIVE; Future  -     LIPID PANEL; Future  -     HEMOGLOBIN A1C WITH EAG; Future    2. Dysthymia  -     citalopram (CELEXA) 20 mg tablet; Take 1 Tablet by mouth daily.     40year old male with type 2 diabetes, glucose improve since starting basaglar  We will check labs today  Dysthymia, well controlled with citalopram, we will refill medication  Follow up in 3 months

## 2023-01-26 LAB
ALBUMIN SERPL-MCNC: 3.7 G/DL (ref 3.5–5)
ALBUMIN/GLOB SERPL: 0.8 (ref 1.1–2.2)
ALP SERPL-CCNC: 92 U/L (ref 45–117)
ALT SERPL-CCNC: 32 U/L (ref 12–78)
ANION GAP SERPL CALC-SCNC: 6 MMOL/L (ref 5–15)
AST SERPL-CCNC: 24 U/L (ref 15–37)
BASOPHILS # BLD: 0.1 K/UL (ref 0–0.1)
BASOPHILS NFR BLD: 2 % (ref 0–1)
BILIRUB SERPL-MCNC: 0.6 MG/DL (ref 0.2–1)
BUN SERPL-MCNC: 14 MG/DL (ref 6–20)
BUN/CREAT SERPL: 15 (ref 12–20)
CALCIUM SERPL-MCNC: 9.5 MG/DL (ref 8.5–10.1)
CHLORIDE SERPL-SCNC: 100 MMOL/L (ref 97–108)
CHOLEST SERPL-MCNC: 256 MG/DL
CO2 SERPL-SCNC: 27 MMOL/L (ref 21–32)
CREAT SERPL-MCNC: 0.93 MG/DL (ref 0.7–1.3)
DIFFERENTIAL METHOD BLD: ABNORMAL
EOSINOPHIL # BLD: 0.6 K/UL (ref 0–0.4)
EOSINOPHIL NFR BLD: 8 % (ref 0–7)
ERYTHROCYTE [DISTWIDTH] IN BLOOD BY AUTOMATED COUNT: 14.1 % (ref 11.5–14.5)
EST. AVERAGE GLUCOSE BLD GHB EST-MCNC: 258 MG/DL
GLOBULIN SER CALC-MCNC: 4.9 G/DL (ref 2–4)
GLUCOSE SERPL-MCNC: 150 MG/DL (ref 65–100)
HBA1C MFR BLD: 10.6 % (ref 4–5.6)
HCT VFR BLD AUTO: 40.9 % (ref 36.6–50.3)
HDLC SERPL-MCNC: 46 MG/DL
HDLC SERPL: 5.6 (ref 0–5)
HGB BLD-MCNC: 13.1 G/DL (ref 12.1–17)
IMM GRANULOCYTES # BLD AUTO: 0 K/UL (ref 0–0.04)
IMM GRANULOCYTES NFR BLD AUTO: 0 % (ref 0–0.5)
LDLC SERPL CALC-MCNC: 151 MG/DL (ref 0–100)
LYMPHOCYTES # BLD: 2.6 K/UL (ref 0.8–3.5)
LYMPHOCYTES NFR BLD: 35 % (ref 12–49)
MCH RBC QN AUTO: 26.8 PG (ref 26–34)
MCHC RBC AUTO-ENTMCNC: 32 G/DL (ref 30–36.5)
MCV RBC AUTO: 83.8 FL (ref 80–99)
MONOCYTES # BLD: 0.7 K/UL (ref 0–1)
MONOCYTES NFR BLD: 9 % (ref 5–13)
NEUTS SEG # BLD: 3.6 K/UL (ref 1.8–8)
NEUTS SEG NFR BLD: 46 % (ref 32–75)
NRBC # BLD: 0 K/UL (ref 0–0.01)
NRBC BLD-RTO: 0 PER 100 WBC
PLATELET # BLD AUTO: 415 K/UL (ref 150–400)
PMV BLD AUTO: 10.3 FL (ref 8.9–12.9)
POTASSIUM SERPL-SCNC: 4.9 MMOL/L (ref 3.5–5.1)
PROT SERPL-MCNC: 8.6 G/DL (ref 6.4–8.2)
RBC # BLD AUTO: 4.88 M/UL (ref 4.1–5.7)
SODIUM SERPL-SCNC: 133 MMOL/L (ref 136–145)
TRIGL SERPL-MCNC: 295 MG/DL (ref ?–150)
VLDLC SERPL CALC-MCNC: 59 MG/DL
WBC # BLD AUTO: 7.6 K/UL (ref 4.1–11.1)

## 2023-01-26 NOTE — PROGRESS NOTES
Hemoglobin a1c is 10.6% which is an improvement  Cholesterol is elevated but also improving  Normal blood count, kidney function, liver function, electrolytes  Follow up in 3 months

## 2023-02-09 ENCOUNTER — TELEPHONE (OUTPATIENT)
Dept: FAMILY MEDICINE CLINIC | Age: 43
End: 2023-02-09

## 2023-02-09 NOTE — TELEPHONE ENCOUNTER
Tc from the pt returning my call. I had called him 2x for labs results message. He was given the providers message, and he was told he would also receive a letter in the mail.  Sena Dinero, RN

## 2023-02-13 ENCOUNTER — TELEPHONE (OUTPATIENT)
Dept: FAMILY MEDICINE CLINIC | Age: 43
End: 2023-02-13

## 2023-02-13 NOTE — TELEPHONE ENCOUNTER
Gualberto from the pt. He called and verified his name and . He stated he was returning my call. I reviewed the chart I had called him several times last week to give him his lab results message from the provider. I gave him his lab results and told him he will be receiving a lab letter in the mail. He verbalized understanding and stated he thought I had called him about something else. I did not see any documentation of calling him for anything other than the labs. I had called him 2x and he had returned my calls last week.  Dunia Melgar RN

## 2023-05-03 ENCOUNTER — HOSPITAL ENCOUNTER (OUTPATIENT)
Dept: LAB | Age: 43
Discharge: HOME | End: 2023-05-03

## 2023-05-03 ENCOUNTER — OFFICE VISIT (OUTPATIENT)
Dept: FAMILY MEDICINE CLINIC | Facility: CLINIC | Age: 43
End: 2023-05-03

## 2023-05-03 VITALS
OXYGEN SATURATION: 95 % | BODY MASS INDEX: 35.14 KG/M2 | HEART RATE: 89 BPM | SYSTOLIC BLOOD PRESSURE: 114 MMHG | HEIGHT: 65 IN | TEMPERATURE: 98.2 F | DIASTOLIC BLOOD PRESSURE: 73 MMHG

## 2023-05-03 DIAGNOSIS — Z98.1 STATUS POST ANKLE FUSION: ICD-10-CM

## 2023-05-03 DIAGNOSIS — E11.65 TYPE 2 DIABETES MELLITUS WITH HYPERGLYCEMIA, WITHOUT LONG-TERM CURRENT USE OF INSULIN (HCC): ICD-10-CM

## 2023-05-03 DIAGNOSIS — E11.65 TYPE 2 DIABETES MELLITUS WITH HYPERGLYCEMIA, WITHOUT LONG-TERM CURRENT USE OF INSULIN (HCC): Primary | ICD-10-CM

## 2023-05-03 LAB — GLUCOSE POC: 225 MG/DL

## 2023-05-03 PROCEDURE — 36415 COLL VENOUS BLD VENIPUNCTURE: CPT

## 2023-05-03 PROCEDURE — 80061 LIPID PANEL: CPT

## 2023-05-03 PROCEDURE — 82962 GLUCOSE BLOOD TEST: CPT | Performed by: FAMILY MEDICINE

## 2023-05-03 PROCEDURE — 85025 COMPLETE CBC W/AUTO DIFF WBC: CPT

## 2023-05-03 PROCEDURE — 80053 COMPREHEN METABOLIC PANEL: CPT

## 2023-05-03 PROCEDURE — 99213 OFFICE O/P EST LOW 20 MIN: CPT | Performed by: FAMILY MEDICINE

## 2023-05-03 PROCEDURE — 83036 HEMOGLOBIN GLYCOSYLATED A1C: CPT

## 2023-05-03 PROCEDURE — 3046F HEMOGLOBIN A1C LEVEL >9.0%: CPT | Performed by: FAMILY MEDICINE

## 2023-05-03 RX ORDER — INSULIN LISPRO 100 [IU]/ML
INJECTION, SOLUTION INTRAVENOUS; SUBCUTANEOUS
Qty: 5 ADJUSTABLE DOSE PRE-FILLED PEN SYRINGE | Refills: 3 | Status: SHIPPED | OUTPATIENT
Start: 2023-05-03

## 2023-05-03 RX ORDER — LISINOPRIL 2.5 MG/1
2.5 TABLET ORAL DAILY
Qty: 30 TABLET | Refills: 11 | COMMUNITY
Start: 2023-03-27 | End: 2024-03-26

## 2023-05-03 RX ORDER — METFORMIN HYDROCHLORIDE 1000 MG/1
1000 TABLET ORAL 2 TIMES DAILY WITH MEALS
Qty: 180 TABLET | Refills: 3 | Status: SHIPPED | OUTPATIENT
Start: 2023-05-03

## 2023-05-04 ENCOUNTER — TELEPHONE (OUTPATIENT)
Dept: FAMILY MEDICINE CLINIC | Age: 43
End: 2023-05-04

## 2023-05-04 LAB
ALBUMIN SERPL-MCNC: 3.8 G/DL (ref 3.5–5)
ALBUMIN/GLOB SERPL: 0.8 (ref 1.1–2.2)
ALP SERPL-CCNC: 103 U/L (ref 45–117)
ALT SERPL-CCNC: 44 U/L (ref 12–78)
ANION GAP SERPL CALC-SCNC: 5 MMOL/L (ref 5–15)
AST SERPL-CCNC: 26 U/L (ref 15–37)
BASOPHILS # BLD: 0.1 K/UL (ref 0–0.1)
BASOPHILS NFR BLD: 2 % (ref 0–1)
BILIRUB SERPL-MCNC: 0.8 MG/DL (ref 0.2–1)
BUN SERPL-MCNC: 19 MG/DL (ref 6–20)
BUN/CREAT SERPL: 17 (ref 12–20)
CALCIUM SERPL-MCNC: 9.9 MG/DL (ref 8.5–10.1)
CHLORIDE SERPL-SCNC: 97 MMOL/L (ref 97–108)
CHOLEST SERPL-MCNC: 279 MG/DL
CO2 SERPL-SCNC: 28 MMOL/L (ref 21–32)
CREAT SERPL-MCNC: 1.11 MG/DL (ref 0.7–1.3)
DIFFERENTIAL METHOD BLD: ABNORMAL
EOSINOPHIL # BLD: 0.8 K/UL (ref 0–0.4)
EOSINOPHIL NFR BLD: 10 % (ref 0–7)
ERYTHROCYTE [DISTWIDTH] IN BLOOD BY AUTOMATED COUNT: 13.7 % (ref 11.5–14.5)
EST. AVERAGE GLUCOSE BLD GHB EST-MCNC: 269 MG/DL
GLOBULIN SER CALC-MCNC: 4.6 G/DL (ref 2–4)
GLUCOSE SERPL-MCNC: 255 MG/DL (ref 65–100)
HBA1C MFR BLD: 11 % (ref 4–5.6)
HCT VFR BLD AUTO: 44.6 % (ref 36.6–50.3)
HDLC SERPL-MCNC: 47 MG/DL
HDLC SERPL: 5.9 (ref 0–5)
HGB BLD-MCNC: 14.1 G/DL (ref 12.1–17)
IMM GRANULOCYTES # BLD AUTO: 0 K/UL (ref 0–0.04)
IMM GRANULOCYTES NFR BLD AUTO: 0 % (ref 0–0.5)
LDLC SERPL CALC-MCNC: 182.6 MG/DL (ref 0–100)
LYMPHOCYTES # BLD: 2.2 K/UL (ref 0.8–3.5)
LYMPHOCYTES NFR BLD: 28 % (ref 12–49)
MCH RBC QN AUTO: 26.8 PG (ref 26–34)
MCHC RBC AUTO-ENTMCNC: 31.6 G/DL (ref 30–36.5)
MCV RBC AUTO: 84.6 FL (ref 80–99)
MONOCYTES # BLD: 0.6 K/UL (ref 0–1)
MONOCYTES NFR BLD: 8 % (ref 5–13)
NEUTS SEG # BLD: 4.1 K/UL (ref 1.8–8)
NEUTS SEG NFR BLD: 52 % (ref 32–75)
NRBC # BLD: 0 K/UL (ref 0–0.01)
NRBC BLD-RTO: 0 PER 100 WBC
PLATELET # BLD AUTO: 422 K/UL (ref 150–400)
PMV BLD AUTO: 10.5 FL (ref 8.9–12.9)
POTASSIUM SERPL-SCNC: 4.8 MMOL/L (ref 3.5–5.1)
PROT SERPL-MCNC: 8.4 G/DL (ref 6.4–8.2)
RBC # BLD AUTO: 5.27 M/UL (ref 4.1–5.7)
SODIUM SERPL-SCNC: 130 MMOL/L (ref 136–145)
TRIGL SERPL-MCNC: 247 MG/DL (ref ?–150)
VLDLC SERPL CALC-MCNC: 49.4 MG/DL
WBC # BLD AUTO: 7.9 K/UL (ref 4.1–11.1)

## 2023-05-04 RX ORDER — ROSUVASTATIN CALCIUM 20 MG/1
20 TABLET, COATED ORAL
Qty: 90 TABLET | Refills: 3 | Status: SHIPPED | OUTPATIENT
Start: 2023-05-04

## 2023-05-11 ENCOUNTER — TELEPHONE (OUTPATIENT)
Age: 43
End: 2023-05-11

## 2023-05-11 RX ORDER — INSULIN GLARGINE 100 [IU]/ML
16 INJECTION, SOLUTION SUBCUTANEOUS NIGHTLY
Qty: 4.8 ML | Refills: 3 | COMMUNITY
Start: 2023-03-02 | End: 2023-06-04

## 2023-05-11 RX ORDER — ROSUVASTATIN CALCIUM 20 MG/1
1 TABLET, COATED ORAL NIGHTLY
COMMUNITY
Start: 2023-05-04 | End: 2023-05-11 | Stop reason: SDUPTHER

## 2023-05-11 RX ORDER — CITALOPRAM 20 MG/1
20 TABLET ORAL DAILY
COMMUNITY
Start: 2022-11-07 | End: 2023-05-11 | Stop reason: SDUPTHER

## 2023-05-11 RX ORDER — INSULIN LISPRO 100 [IU]/ML
INJECTION, SOLUTION INTRAVENOUS; SUBCUTANEOUS
COMMUNITY
Start: 2023-03-02

## 2023-05-11 RX ORDER — ROSUVASTATIN CALCIUM 20 MG/1
20 TABLET, COATED ORAL NIGHTLY
Qty: 90 TABLET | Refills: 3 | Status: SHIPPED | OUTPATIENT
Start: 2023-05-11

## 2023-05-11 RX ORDER — CITALOPRAM 20 MG/1
20 TABLET ORAL DAILY
Qty: 90 TABLET | Refills: 3 | Status: SHIPPED | OUTPATIENT
Start: 2023-05-11

## 2023-05-11 NOTE — TELEPHONE ENCOUNTER
Patient contacted with assistance of [facility] interpretor #[id number]. The following message was relayed from provider Darcy Cooney MD: 'Diabetes is uncontrolled (insulin was adjusted during the visit). Cholesterol is very high,  we will start rosuvastatin. This medication will be sent to his pharmacy. Patient has follow up with me next month.'     The patient requested for medications to be sent to a different pharmacy ([facility]). Physician was made aware. Patient was given an opportunity to voice questions/concerns. All questions were addressed.

## 2023-05-21 RX ORDER — LISINOPRIL 2.5 MG/1
2.5 TABLET ORAL DAILY
Qty: 30 TABLET | Refills: 11 | COMMUNITY
Start: 2023-03-27 | End: 2024-03-26

## 2023-05-31 ENCOUNTER — OFFICE VISIT (OUTPATIENT)
Age: 43
End: 2023-05-31

## 2023-05-31 VITALS
DIASTOLIC BLOOD PRESSURE: 82 MMHG | TEMPERATURE: 97.5 F | HEART RATE: 98 BPM | OXYGEN SATURATION: 100 % | SYSTOLIC BLOOD PRESSURE: 115 MMHG

## 2023-05-31 DIAGNOSIS — E11.65 TYPE 2 DIABETES MELLITUS WITH HYPERGLYCEMIA, WITHOUT LONG-TERM CURRENT USE OF INSULIN (HCC): Primary | ICD-10-CM

## 2023-05-31 DIAGNOSIS — F34.1 DYSTHYMIC DISORDER: ICD-10-CM

## 2023-05-31 LAB — GLUCOSE, POC: ABNORMAL MG/DL

## 2023-05-31 PROCEDURE — 3046F HEMOGLOBIN A1C LEVEL >9.0%: CPT | Performed by: FAMILY MEDICINE

## 2023-05-31 PROCEDURE — 82962 GLUCOSE BLOOD TEST: CPT | Performed by: FAMILY MEDICINE

## 2023-05-31 PROCEDURE — 99213 OFFICE O/P EST LOW 20 MIN: CPT | Performed by: FAMILY MEDICINE

## 2023-05-31 RX ORDER — ROSUVASTATIN CALCIUM 20 MG/1
20 TABLET, COATED ORAL NIGHTLY
Qty: 90 TABLET | Refills: 3 | Status: SHIPPED | OUTPATIENT
Start: 2023-05-31

## 2023-05-31 RX ORDER — CITALOPRAM 20 MG/1
20 TABLET ORAL DAILY
Qty: 90 TABLET | Refills: 3 | Status: SHIPPED | OUTPATIENT
Start: 2023-05-31

## 2023-05-31 RX ORDER — INSULIN GLARGINE 100 [IU]/ML
20 INJECTION, SOLUTION SUBCUTANEOUS NIGHTLY
Qty: 5 ADJUSTABLE DOSE PRE-FILLED PEN SYRINGE | Refills: 3 | Status: SHIPPED | OUTPATIENT
Start: 2023-05-31

## 2023-05-31 RX ORDER — PEN NEEDLE, DIABETIC 30 GX3/16"
NEEDLE, DISPOSABLE MISCELLANEOUS
Qty: 100 EACH | Refills: 5 | Status: SHIPPED | OUTPATIENT
Start: 2023-05-31

## 2023-05-31 ASSESSMENT — PATIENT HEALTH QUESTIONNAIRE - PHQ9
SUM OF ALL RESPONSES TO PHQ QUESTIONS 1-9: 0
1. LITTLE INTEREST OR PLEASURE IN DOING THINGS: 0
SUM OF ALL RESPONSES TO PHQ QUESTIONS 1-9: 0
2. FEELING DOWN, DEPRESSED OR HOPELESS: 0
SUM OF ALL RESPONSES TO PHQ9 QUESTIONS 1 & 2: 0

## 2023-05-31 ASSESSMENT — ENCOUNTER SYMPTOMS
BACK PAIN: 0
DIARRHEA: 0
CHEST TIGHTNESS: 0
ABDOMINAL PAIN: 0
WHEEZING: 0
SHORTNESS OF BREATH: 0
CONSTIPATION: 0
STRIDOR: 0
ABDOMINAL DISTENTION: 0

## 2023-05-31 NOTE — PROGRESS NOTES
Subjective:      Patient ID: Steven He is a 40 y.o. male. HPI  Patient was [facility] at [facility] for his leg would. He has been taking antibiotics and the leg and foot is   Less swollen now. Glucose levels are running in the 200-210. He has ran out of lantus and will need a new prescription  Review of Systems   Constitutional:  Negative for activity change. Respiratory:  Negative for chest tightness, shortness of breath, wheezing and stridor. Cardiovascular:  Positive for leg swelling. Negative for chest pain and palpitations. Gastrointestinal:  Negative for abdominal distention, abdominal pain, constipation and diarrhea. Genitourinary:  Negative for difficulty urinating and urgency. Musculoskeletal:  Positive for gait problem. Negative for arthralgias, back pain, joint swelling and myalgias. /82 (Site: Left Upper Arm, Position: Sitting, Cuff Size: Large Adult)   Pulse 98   Temp 97.5 °F (36.4 °C) (Temporal)   SpO2 100%   Objective:   Physical Exam  Constitutional:       General: He is not in acute distress. HENT:      Right Ear: Tympanic membrane normal.      Left Ear: Tympanic membrane normal.   Cardiovascular:      Rate and Rhythm: Normal rate and regular rhythm. Heart sounds: Normal heart sounds. No murmur heard. Pulmonary:      Effort: Pulmonary effort is normal.      Breath sounds: No wheezing, rhonchi or rales. Abdominal:      General: Bowel sounds are normal. There is no distension. Palpations: Abdomen is soft. Musculoskeletal:         General: No swelling or tenderness. Normal range of motion. Cervical back: Normal range of motion. No rigidity. Neurological:      Mental Status: He is alert.        Assessment:      Frances Tolbert was seen today for follow-up from hospital.    Diagnoses and all orders for this visit:    Type 2 diabetes mellitus with hyperglycemia, without long-term current use of insulin ([facility])  -     AMB POC GLUCOSE BLOOD, BY GLUCOSE MONITORING

## 2023-05-31 NOTE — PROGRESS NOTES
Results for orders placed or performed in visit on 05/31/23   AMB POC GLUCOSE BLOOD, BY GLUCOSE MONITORING DEVICE   Result Value Ref Range    Glucose,  f MG/DL     Coordination of Care  1. Have you been to the ER, urgent care clinic since your last visit? Hospitalized since your last visit? yes [facility]    2. Have you seen or consulted any other health care providers outside of the [address] [place] [address] since your last visit? Include any pap smears or colon screening. no    Does the patient need refills? no    Learning Assessment Complete?  yes  Depression Screening complete in the past 12 months? yes

## 2023-05-31 NOTE — PROGRESS NOTES
Caitlyn 6 seen at d/c, full name and  verified. After Visit Summary provided and reviewed. Medication list reviewed. Teach back method utilized to ensure patient accurately understands how to and when to take each medication. GoodRx coupon provided and explained how to use. Opportunity for questions provided, patient denies any questions.  Ezequiel Vasquez RN

## 2024-01-02 ENCOUNTER — OFFICE VISIT (OUTPATIENT)
Age: 44
End: 2024-01-02

## 2024-01-02 VITALS
TEMPERATURE: 97.5 F | BODY MASS INDEX: 35.82 KG/M2 | DIASTOLIC BLOOD PRESSURE: 89 MMHG | HEART RATE: 74 BPM | OXYGEN SATURATION: 97 % | WEIGHT: 215 LBS | SYSTOLIC BLOOD PRESSURE: 138 MMHG

## 2024-01-02 DIAGNOSIS — Z79.4 TYPE 2 DIABETES MELLITUS WITH HYPERGLYCEMIA, WITH LONG-TERM CURRENT USE OF INSULIN (HCC): Primary | ICD-10-CM

## 2024-01-02 DIAGNOSIS — F34.1 DYSTHYMIC DISORDER: ICD-10-CM

## 2024-01-02 DIAGNOSIS — N52.1 ERECTILE DYSFUNCTION DUE TO DISEASES CLASSIFIED ELSEWHERE: ICD-10-CM

## 2024-01-02 DIAGNOSIS — E11.65 TYPE 2 DIABETES MELLITUS WITH HYPERGLYCEMIA, WITH LONG-TERM CURRENT USE OF INSULIN (HCC): Primary | ICD-10-CM

## 2024-01-02 LAB — GLUCOSE, POC: 231 MG/DL

## 2024-01-02 PROCEDURE — 99214 OFFICE O/P EST MOD 30 MIN: CPT | Performed by: FAMILY MEDICINE

## 2024-01-02 PROCEDURE — 82962 GLUCOSE BLOOD TEST: CPT | Performed by: FAMILY MEDICINE

## 2024-01-02 RX ORDER — CITALOPRAM 20 MG/1
20 TABLET ORAL DAILY
Qty: 90 TABLET | Refills: 3 | Status: SHIPPED | OUTPATIENT
Start: 2024-01-02 | End: 2024-01-02

## 2024-01-02 RX ORDER — EXENATIDE 2 MG/.65ML
2 INJECTION, SUSPENSION, EXTENDED RELEASE SUBCUTANEOUS WEEKLY
Qty: 12 PEN | Refills: 3
Start: 2024-01-02

## 2024-01-02 RX ORDER — ROSUVASTATIN CALCIUM 20 MG/1
20 TABLET, COATED ORAL NIGHTLY
Qty: 90 TABLET | Refills: 3 | Status: SHIPPED | OUTPATIENT
Start: 2024-01-02

## 2024-01-02 RX ORDER — CITALOPRAM 20 MG/1
20 TABLET ORAL DAILY
Qty: 90 TABLET | Refills: 3 | Status: SHIPPED | OUTPATIENT
Start: 2024-01-02

## 2024-01-02 RX ORDER — LISINOPRIL 10 MG/1
10 TABLET ORAL DAILY
Qty: 90 TABLET | Refills: 1 | Status: SHIPPED | OUTPATIENT
Start: 2024-01-02

## 2024-01-02 RX ORDER — LISINOPRIL 10 MG/1
10 TABLET ORAL DAILY
Qty: 90 TABLET | Refills: 1 | Status: SHIPPED | OUTPATIENT
Start: 2024-01-02 | End: 2024-01-02

## 2024-01-02 RX ORDER — INSULIN GLARGINE 100 [IU]/ML
20 INJECTION, SOLUTION SUBCUTANEOUS NIGHTLY
Qty: 5 ADJUSTABLE DOSE PRE-FILLED PEN SYRINGE | Refills: 5 | Status: SHIPPED | OUTPATIENT
Start: 2024-01-02 | End: 2024-01-02

## 2024-01-02 RX ORDER — ROSUVASTATIN CALCIUM 20 MG/1
20 TABLET, COATED ORAL NIGHTLY
Qty: 90 TABLET | Refills: 3 | Status: SHIPPED | OUTPATIENT
Start: 2024-01-02 | End: 2024-01-02

## 2024-01-02 RX ORDER — SILDENAFIL 100 MG/1
50 TABLET, FILM COATED ORAL PRN
Qty: 30 TABLET | Refills: 1 | Status: SHIPPED | OUTPATIENT
Start: 2024-01-02

## 2024-01-02 RX ORDER — INSULIN GLARGINE 100 [IU]/ML
20 INJECTION, SOLUTION SUBCUTANEOUS NIGHTLY
Qty: 5 ADJUSTABLE DOSE PRE-FILLED PEN SYRINGE | Refills: 5 | Status: SHIPPED | OUTPATIENT
Start: 2024-01-02

## 2024-01-02 RX ORDER — SILDENAFIL 100 MG/1
50 TABLET, FILM COATED ORAL PRN
Qty: 30 TABLET | Refills: 1 | Status: SHIPPED | OUTPATIENT
Start: 2024-01-02 | End: 2024-01-02

## 2024-01-02 SDOH — ECONOMIC STABILITY: HOUSING INSECURITY
IN THE LAST 12 MONTHS, WAS THERE A TIME WHEN YOU DID NOT HAVE A STEADY PLACE TO SLEEP OR SLEPT IN A SHELTER (INCLUDING NOW)?: NO

## 2024-01-02 SDOH — ECONOMIC STABILITY: FOOD INSECURITY: WITHIN THE PAST 12 MONTHS, THE FOOD YOU BOUGHT JUST DIDN'T LAST AND YOU DIDN'T HAVE MONEY TO GET MORE.: NEVER TRUE

## 2024-01-02 SDOH — ECONOMIC STABILITY: FOOD INSECURITY: WITHIN THE PAST 12 MONTHS, YOU WORRIED THAT YOUR FOOD WOULD RUN OUT BEFORE YOU GOT MONEY TO BUY MORE.: NEVER TRUE

## 2024-01-02 SDOH — ECONOMIC STABILITY: INCOME INSECURITY: HOW HARD IS IT FOR YOU TO PAY FOR THE VERY BASICS LIKE FOOD, HOUSING, MEDICAL CARE, AND HEATING?: NOT HARD AT ALL

## 2024-01-02 ASSESSMENT — PATIENT HEALTH QUESTIONNAIRE - PHQ9
2. FEELING DOWN, DEPRESSED OR HOPELESS: 0
SUM OF ALL RESPONSES TO PHQ QUESTIONS 1-9: 0
1. LITTLE INTEREST OR PLEASURE IN DOING THINGS: 0
SUM OF ALL RESPONSES TO PHQ9 QUESTIONS 1 & 2: 0
SUM OF ALL RESPONSES TO PHQ QUESTIONS 1-9: 0

## 2024-01-02 ASSESSMENT — ENCOUNTER SYMPTOMS
COUGH: 0
VOMITING: 0
NAUSEA: 0
DIARRHEA: 0
ABDOMINAL PAIN: 0
CONSTIPATION: 0
SHORTNESS OF BREATH: 0

## 2024-01-02 NOTE — PROGRESS NOTES
\"Have you been to the ER, urgent care clinic since your last visit?  Hospitalized since your last visit?\"    NO    “Have you seen or consulted any other health care providers outside of [facility] since your last visit?”    NO       Results for orders placed or performed in visit on 01/02/24   AMB POC GLUCOSE BLOOD, BY GLUCOSE MONITORING DEVICE   Result Value Ref Range    Glucose,  MG/DL     Non-fasting glucose

## 2024-01-02 NOTE — PROGRESS NOTES
Hernesto Coughlin is a 43 y.o. male   Chief Complaint   Patient presents with    Diabetes     Follow up for type 2 diabetes         ASSESSMENT AND PLAN:    1. Type 2 diabetes mellitus with hyperglycemia, with long-term current use of insulin ([facility])  Improved with borrowed GLP-1 (likely ozempic)  Will try to apply for bydureon as trulicity is closed and others need SSI.  Lantus is now affordable, resume 20U QHS.  Continue metformin and 15U humulin with meals.  BP not quite at goal of <130/80. Resume lisinopril. Resume crestor.  Follow up in about 6 weeks.  - AMB POC GLUCOSE BLOOD, BY GLUCOSE MONITORING DEVICE  - LANTUS SOLOSTAR 100 UNIT/ML injection pen; Inject 20 Units into the skin nightly  Dispense: 5 Adjustable Dose Pre-filled Pen Syringe; Refill: 5  - lisinopril (PRINIVIL;ZESTRIL) 10 MG tablet; Take 1 tablet by mouth daily  Dispense: 90 tablet; Refill: 1  - rosuvastatin (CRESTOR) 20 MG tablet; Take 1 tablet by mouth nightly  Dispense: 90 tablet; Refill: 3  - Exenatide (BYDUREON) 2 MG PEN; Inject 1 pen  into the skin once a week VIA [facility]. No print.  Dispense: 12 pen ; Refill: 3    2. Dysthymic disorder  - citalopram (CELEXA) 20 MG tablet; Take 1 tablet by mouth daily  Dispense: 90 tablet; Refill: 3    3. Erectile dysfunction due to diseases classified elsewhere  - sildenafil (VIAGRA) 100 MG tablet; Take 0.5 tablets by mouth as needed for Erectile Dysfunction  Dispense: 30 tablet; Refill: 1        SUBJECTIVE:    HPI:  Hernesto Coughlin is a 43 y.o. male who presents for diabetes followup.  He has not been able to work in over a year.  His foot surgery has been put off until his BG can be better controlled.   They were unable to afford basaglar so he has been taking 20U of humulin before meals.  His mom has diabetes and lives in [place]. She gets all her meds free. She \"lent\" him some of her weekly injection (unsure which) and his BG have been between 180 and 250 this week -- previously had always been about

## 2024-01-03 NOTE — PROGRESS NOTES
Patient speaks english.  Zoila Baer LPN    Patient name and date of birth verified.   Patient given an after visit summary, reviewed medications on how and when to take, coupons given to present to pharmacy for medication discount.  Advised to schedule next appointment before leaving [facility] ( [place]).  Patient verbalized understanding of all information given at time of visit. Zoila Baer LPN    Patient requested that pharmacy be changed, pharmacy and medications routed to [facility].  Zoila Baer LPN